# Patient Record
Sex: MALE | Race: WHITE
[De-identification: names, ages, dates, MRNs, and addresses within clinical notes are randomized per-mention and may not be internally consistent; named-entity substitution may affect disease eponyms.]

---

## 2019-06-19 ENCOUNTER — HOSPITAL ENCOUNTER (INPATIENT)
Dept: HOSPITAL 11 - JP.ED | Age: 59
LOS: 2 days | Discharge: HOME | DRG: 720 | End: 2019-06-21
Attending: INTERNAL MEDICINE | Admitting: INTERNAL MEDICINE
Payer: COMMERCIAL

## 2019-06-19 DIAGNOSIS — E23.2: ICD-10-CM

## 2019-06-19 DIAGNOSIS — R11.2: ICD-10-CM

## 2019-06-19 DIAGNOSIS — R41.0: ICD-10-CM

## 2019-06-19 DIAGNOSIS — J96.01: ICD-10-CM

## 2019-06-19 DIAGNOSIS — E78.00: ICD-10-CM

## 2019-06-19 DIAGNOSIS — N17.9: ICD-10-CM

## 2019-06-19 DIAGNOSIS — A41.9: Primary | ICD-10-CM

## 2019-06-19 DIAGNOSIS — J18.1: ICD-10-CM

## 2019-06-19 DIAGNOSIS — R19.7: ICD-10-CM

## 2019-06-19 DIAGNOSIS — Z87.898: ICD-10-CM

## 2019-06-19 DIAGNOSIS — I10: ICD-10-CM

## 2019-06-19 DIAGNOSIS — R65.20: ICD-10-CM

## 2019-06-19 DIAGNOSIS — R51: ICD-10-CM

## 2019-06-19 RX ADMIN — TAZOBACTAM SODIUM AND PIPERACILLIN SODIUM SCH MLS/HR: 500; 4 INJECTION, SOLUTION INTRAVENOUS at 21:22

## 2019-06-19 RX ADMIN — TAZOBACTAM SODIUM AND PIPERACILLIN SODIUM SCH MLS/HR: 500; 4 INJECTION, SOLUTION INTRAVENOUS at 16:28

## 2019-06-19 RX ADMIN — TAZOBACTAM SODIUM AND PIPERACILLIN SODIUM SCH MLS/HR: 500; 4 INJECTION, SOLUTION INTRAVENOUS at 09:36

## 2019-06-19 RX ADMIN — Medication SCH CAP: at 21:20

## 2019-06-19 RX ADMIN — ONDANSETRON PRN MG: 4 TABLET, ORALLY DISINTEGRATING ORAL at 16:27

## 2019-06-19 RX ADMIN — Medication SCH CAP: at 12:06

## 2019-06-19 RX ADMIN — Medication SCH MCG: at 21:21

## 2019-06-19 RX ADMIN — Medication SCH MCG: at 09:37

## 2019-06-19 RX ADMIN — Medication SCH: at 16:39

## 2019-06-19 NOTE — CRLCR
INDICATION:



Fever



TECHNIQUE:



Chest 2 views.



COMPARISON:



 None 



FINDINGS:



Normal cardiomediastinal silhouette. Lung volumes are low which accentuates 

the vascular markings. Minimal atelectasis versus infiltrate at the left 

lung base and lingula. No pneumothorax or effusion. No acute osseous 

abnormality. 



IMPRESSION:



Atelectasis versus infiltrate at the left lung base and lingula.



Dictated by Aliya Beltrán MD @ Jun 19 2019  3:57AM



Signed by Dr. Aliya Beltrán @ Jun 19 2019  3:57AM

## 2019-06-19 NOTE — EDM.PDOC
ED HPI GENERAL MEDICAL PROBLEM





- General


Chief Complaint: Fever


Stated Complaint: HIGH FEVER/CONFUSION


Time Seen by Provider: 06/19/19 01:54


Source of Information: Reports: Patient, Family, RN Notes Reviewed


History Limitations: Reports: Altered Mental Status





- History of Present Illness


INITIAL COMMENTS - FREE TEXT/NARRATIVE: 





59-year-old gentleman presents emergency department today complaint of fever 

and confusion. Gentleman is from [place] he's been here visiting over the 

last 24 hours she's become more confused has had difficulty with ambulation and 

balance has developed a fever which does respond to Tylenol. Does have a 

history of pituitary tumor as well as diabetes insipidus. At this time 

complains of headache and has difficulty finding words appears to be confused 

but does follow commands


Treatments PTA: Reports: Acetaminophen, Other (see below)


Other Treatments PTA: 8pm tylenol


  ** headache


Pain Score (Numeric/FACES): 8





- Related Data


 Allergies











Allergy/AdvReac Type Severity Reaction Status Date / Time


 


No Known Allergies Allergy   Verified 06/19/19 01:47











Home Meds: 


 Home Meds





Desmopressin [Desmopressin 0.1% Nasal Spray] 500 mcg INH DAILY 06/19/19 [History

]


Docusate Sodium 100 mg PO DAILY 06/19/19 [History]


Finasteride [Proscar] 5 mg PO DAILY 06/19/19 [History]


Hydrocortisone [Cortef] 5 mg PO PCDINNER 06/19/19 [History]


Hydrocortisone [Cortef] 12.5 mg PO PCBREAKFAST 06/19/19 [History]


Levothyroxine 112 mcg PO ACBREAKFAST 06/19/19 [History]


Melatonin/Herbal Complex #184 [Melatonin + l-Theanine Softgel] 3 mg PO BEDTIME 

06/19/19 [History]


Nebivolol HCl [Bystolic] 2.5 mg PO DAILY 06/19/19 [History]


Ramipril [Altace] 10 mg PO DAILY 06/19/19 [History]


Rosuvastatin [Crestor] 20 mg PO BEDTIME 06/19/19 [History]


Tamsulosin HCl [Flomax] 0.4 mg PO BEDTIME 06/19/19 [History]


Testosterone 75 gm TOP DAILY 06/19/19 [History]


Zolpidem Tartrate [Ambien] 5 mg PO BEDTIME 06/19/19 [History]











Past Medical History


Endocrine/Metabolic History: Reports: Other (See Below) (Diabetes insipidus)





Social & Family History





- Tobacco Use


Smoking Status *Q: Never Smoker





ED ROS GENERAL





- Review of Systems


Review Of Systems: See Below


Constitutional: Reports: Fever, Chills, Weakness, Fatigue, Other (Cold-like 

symptoms)


HEENT: Reports: No Symptoms


Respiratory: Reports: No Symptoms, Cough


Cardiovascular: Reports: No Symptoms


GI/Abdominal: Reports: No Symptoms


: Reports: No Symptoms


Musculoskeletal: Reports: No Symptoms


Skin: Reports: No Symptoms


Neurological: Reports: Confusion





ED EXAM, SEPSIS





- Physical Exam


Exam: See Below


Text/Narrative:: 





General: Male, ill-appearing, alert GCS 15 but confused difficulty finding 

words difficulty completing sentences HEENT: head is atraumatic normocephalic, 

eyes pupils equal round reactive to light, sclera clear no conjunctivitis 

appreciated.  Ears tympanic membranes clear and gray landmarks and light reflex 

are present bilaterally canals are clear.  Nose no septal deviation, nares are 

clear, no blood present.  Mouth mucosa is moist and pink no erythema or exudate 

noted in soft palate, tongue is midline uvula is midline, dentition is intact.


Neck: Supple no thyromegaly no tracheal deviation.


Nodes: Cervical nodes subclavicular nodes nontender no palpable lymphadenopathy 

noted.


Lungs: clear to auscultation bilaterally with symmetrical respirations, no 

adventitious noise appreciated.


CV: Regular rate and rhythm S1 and S2 appreciated no murmurs rubs or gallops 

noted.


Abdomen: Soft, nontender, no palpable masses or organomegaly appreciated, no 

distention no guarding bowel sounds are present, .


Neuro: Cranial nerves II through XII grossly intact


Skin: Warm and dry, intact


Extremities: No lower extremity edema appreciated, pedal pulse is +2.





He places his chin on his chest this does not increase his headache pain





Course





- Vital Signs


Last Recorded V/S: 


 Last Vital Signs











Temp  99.5 F   06/19/19 04:51


 


Pulse  87   06/19/19 04:51


 


Resp  20   06/19/19 04:51


 


BP  124/77   06/19/19 04:51


 


Pulse Ox  91 L  06/19/19 04:51














- Orders/Labs/Meds


Orders: 


 Active Orders 24 hr











 Category Date Time Status


 


 Peripheral IV Care [RC] .AS DIRECTED Care  06/19/19 02:06 Active


 


 Peripheral IV Care [RC] .AS DIRECTED Care  06/19/19 02:12 Active


 


 Vital Signs [RC] Q1H Care  06/19/19 02:00 Active


 


 CULTURE BLOOD [BC] Urgent Lab  06/19/19 01:45 Received


 


 CULTURE BLOOD [BC] Urgent Lab  06/19/19 02:00 Received


 


 Lactated Ringers [Ringers, Lactated] 1,000 ml Med  06/19/19 02:00 Active





 IV ASDIRECTED   


 


 Lactated Ringers [Ringers, Lactated] 1,000 ml Med  06/19/19 03:45 Active





 IV ASDIRECTED   


 


 Sodium Chloride 0.9% [Saline Flush] Med  06/19/19 02:06 Active





 10 ml FLUSH ASDIRECTED PRN   


 


 Sodium Chloride 0.9% [Saline Flush] Med  06/19/19 02:11 Active





 10 ml FLUSH ASDIRECTED PRN   


 


 Blood Culture x2 Reflex Set [OM.PC] Urgent Oth  06/19/19 02:00 Ordered


 


 Peripheral IV Insertion Adult [OM.PC] Routine Oth  06/19/19 02:11 Ordered


 


 Peripheral IV Insertion Adult [OM.PC] Urgent Oth  06/19/19 02:06 Ordered








 Medication Orders





Lactated Ringer's (Ringers, Lactated)  1,000 mls @ 999 mls/hr IV ASDIRECTED [facility]


   Last Admin: 06/19/19 02:13  Dose: 999 mls/hr


Lactated Ringer's (Ringers, Lactated)  1,000 mls @ 999 mls/hr IV ASDIRECTED CYDNEY


   Last Admin: 06/19/19 03:42  Dose: 999 mls/hr


Sodium Chloride (Saline Flush)  10 ml FLUSH ASDIRECTED PRN


   PRN Reason: Keep Vein Open


   Last Admin: 06/19/19 02:14  Dose: 10 ml


Sodium Chloride (Saline Flush)  10 ml FLUSH ASDIRECTED PRN


   PRN Reason: Keep Vein Open


   Last Admin: 06/19/19 02:35  Dose: 10 ml








Labs: 


 Laboratory Tests











  06/19/19 06/19/19 06/19/19 Range/Units





  02:00 02:00 02:00 


 


WBC  4.8    (4.5-11.0)  K/uL


 


RBC  5.60    (4.30-5.90)  M/uL


 


Hgb  15.5 H    (12.0-15.0)  g/dL


 


Hct  46.3    (40.0-54.0)  %


 


MCV  83    (80-98)  fL


 


MCH  28    (27-31)  pg


 


MCHC  34    (32-36)  %


 


Plt Count  112 L    (150-400)  K/uL


 


Neut % (Auto)  46    (36-66)  %


 


Lymph % (Auto)  32    (24-44)  %


 


Mono % (Auto)  21 H    (2-6)  %


 


Eos % (Auto)  0 L    (2-4)  %


 


Baso % (Auto)  1    (0-1)  %


 


Sodium   134 L   (140-148)  mmol/L


 


Potassium   4.4   (3.6-5.2)  mmol/L


 


Chloride   98 L   (100-108)  mmol/L


 


Carbon Dioxide   25   (21-32)  mmol/L


 


Anion Gap   15.4 H   (5.0-14.0)  mmol/L


 


BUN   21 H   (7-18)  mg/dL


 


Creatinine   1.8 H   (0.8-1.3)  mg/dL


 


Est Cr Clr Drug Dosing   40.29   mL/min


 


Estimated GFR (MDRD)   39 L   (>60)  


 


Glucose   94   ()  mg/dL


 


Lactic Acid    1.4  (0.4-2.0)  mmol/L


 


Calcium   9.2   (8.5-10.1)  mg/dL


 


Total Bilirubin   0.9   (0.2-1.0)  mg/dL


 


AST   37   (15-37)  U/L


 


ALT   50   (12-78)  U/L


 


Alkaline Phosphatase   53   ()  U/L


 


C-Reactive Protein   9.46 H   (0.0-0.3)  mg/dL


 


Total Protein   6.8   (6.4-8.2)  g/dL


 


Albumin   3.9   (3.4-5.0)  g/dL


 


Globulin   2.9   (2.3-3.5)  g/dL


 


Albumin/Globulin Ratio   1.3   (1.2-2.2)  


 


Procalcitonin     ng/mL


 


Urine Color     


 


Urine Appearance     


 


Urine pH     (4.5-8.0)  


 


Ur Specific Gravity     (1.008-1.030)  


 


Urine Protein     (NEGATIVE)  mg/dL


 


Urine Glucose (UA)     (NEGATIVE)  mg/dL


 


Urine Ketones     (NEGATIVE)  mg/dL


 


Urine Occult Blood     (NEGATIVE)  


 


Urine Nitrite     (NEGAITVE)  


 


Urine Bilirubin     (NEGATIVE)  


 


Urine Urobilinogen     (NORMAL)  mg/dL


 


Ur Leukocyte Esterase     (NEGATIVE)  


 


Urine RBC     (0-5)  


 


Urine WBC     (0-5)  


 


Ur Epithelial Cells     


 


Amorphous Sediment     


 


Urine Bacteria     


 


Urine Mucus     














  06/19/19 06/19/19 Range/Units





  02:00 04:05 


 


WBC    (4.5-11.0)  K/uL


 


RBC    (4.30-5.90)  M/uL


 


Hgb    (12.0-15.0)  g/dL


 


Hct    (40.0-54.0)  %


 


MCV    (80-98)  fL


 


MCH    (27-31)  pg


 


MCHC    (32-36)  %


 


Plt Count    (150-400)  K/uL


 


Neut % (Auto)    (36-66)  %


 


Lymph % (Auto)    (24-44)  %


 


Mono % (Auto)    (2-6)  %


 


Eos % (Auto)    (2-4)  %


 


Baso % (Auto)    (0-1)  %


 


Sodium    (140-148)  mmol/L


 


Potassium    (3.6-5.2)  mmol/L


 


Chloride    (100-108)  mmol/L


 


Carbon Dioxide    (21-32)  mmol/L


 


Anion Gap    (5.0-14.0)  mmol/L


 


BUN    (7-18)  mg/dL


 


Creatinine    (0.8-1.3)  mg/dL


 


Est Cr Clr Drug Dosing    mL/min


 


Estimated GFR (MDRD)    (>60)  


 


Glucose    ()  mg/dL


 


Lactic Acid    (0.4-2.0)  mmol/L


 


Calcium    (8.5-10.1)  mg/dL


 


Total Bilirubin    (0.2-1.0)  mg/dL


 


AST    (15-37)  U/L


 


ALT    (12-78)  U/L


 


Alkaline Phosphatase    ()  U/L


 


C-Reactive Protein    (0.0-0.3)  mg/dL


 


Total Protein    (6.4-8.2)  g/dL


 


Albumin    (3.4-5.0)  g/dL


 


Globulin    (2.3-3.5)  g/dL


 


Albumin/Globulin Ratio    (1.2-2.2)  


 


Procalcitonin  0.18   ng/mL


 


Urine Color   Yellow  


 


Urine Appearance   Clear  


 


Urine pH   5.0  (4.5-8.0)  


 


Ur Specific Gravity   1.010  (1.008-1.030)  


 


Urine Protein   Negative  (NEGATIVE)  mg/dL


 


Urine Glucose (UA)   Normal  (NEGATIVE)  mg/dL


 


Urine Ketones   Negative  (NEGATIVE)  mg/dL


 


Urine Occult Blood   Negative  (NEGATIVE)  


 


Urine Nitrite   Negative  (NEGAITVE)  


 


Urine Bilirubin   Negative  (NEGATIVE)  


 


Urine Urobilinogen   Normal  (NORMAL)  mg/dL


 


Ur Leukocyte Esterase   Negative  (NEGATIVE)  


 


Urine RBC   0-5  (0-5)  


 


Urine WBC   0-5  (0-5)  


 


Ur Epithelial Cells   Not seen  


 


Amorphous Sediment   Few  


 


Urine Bacteria   Not seen  


 


Urine Mucus   Not seen  











Meds: 


Medications











Generic Name Dose Route Start Last Admin





  Trade Name Freq  PRN Reason Stop Dose Admin


 


Lactated Ringer's  1,000 mls @ 999 mls/hr  06/19/19 02:00  06/19/19 02:13





  Ringers, Lactated  IV   999 mls/hr





  ASDIRECTED CYDNEY   Administration





     





     





     





     


 


Lactated Ringer's  1,000 mls @ 999 mls/hr  06/19/19 03:45  06/19/19 03:42





  Ringers, Lactated  IV   999 mls/hr





  ASDIRECTED CYDNEY   Administration





     





     





     





     


 


Sodium Chloride  10 ml  06/19/19 02:06  06/19/19 02:14





  Saline Flush  FLUSH   10 ml





  ASDIRECTED PRN   Administration





  Keep Vein Open   





     





     





     


 


Sodium Chloride  10 ml  06/19/19 02:11  06/19/19 02:35





  Saline Flush  FLUSH   10 ml





  ASDIRECTED PRN   Administration





  Keep Vein Open   





     





     





     














Discontinued Medications














Generic Name Dose Route Start Last Admin





  Trade Name Freq  PRN Reason Stop Dose Admin


 


Fentanyl  50 mcg  06/19/19 04:49  06/19/19 04:57





  Sublimaze  IVPUSH  06/19/19 04:50  50 mcg





  ONETIME ONE   Administration





     





     





     





     


 


Piperacillin Sod/Tazobactam  100 mls @ 200 mls/hr  06/19/19 02:00  06/19/19 03:

14





  Sod 4.5 gm/ Sodium Chloride  IV   Not Given





  Q6H CYDNEY   





     





     





     





     


 


Piperacillin Sod/Tazobactam  100 mls @ 100 mls/hr  06/19/19 02:07  06/19/19 02:

40





  Sod 4.5 gm/ Sodium Chloride  IV  06/19/19 03:06  100 mls/hr





  ONETIME ONE   Administration





     





     





     





     


 


Ibuprofen  600 mg  06/19/19 02:02  06/19/19 02:08





  Motrin  PO  06/19/19 02:03  600 mg





  ONETIME ONE   Administration





     





     





     





     


 


Ondansetron HCl  4 mg  06/19/19 02:28  06/19/19 02:33





  Zofran  IVPUSH  06/19/19 02:29  4 mg





  ONETIME ONE   Administration





     





     





     





     














Departure





- Departure


Time of Disposition: 05:28


Disposition: Refer to Observation


Condition: Fair


Clinical Impression: 


Community acquired pneumonia


Qualifiers:


 Laterality: left Lung location: lower lobe of lung Qualified Code(s): J18.1 - 

Lobar pneumonia, unspecified organism








- Discharge Information


Referrals: 


PCP,None [Primary Care Provider] - 


Forms:  ED Department Discharge





- My Orders


Last 24 Hours: 


My Active Orders





06/19/19 01:45


CULTURE BLOOD [BC] Urgent 





06/19/19 02:00


Vital Signs [RC] Q1H 


CULTURE BLOOD [BC] Urgent 


Lactated Ringers [Ringers, Lactated] 1,000 ml IV ASDIRECTED 


Blood Culture x2 Reflex Set [OM.PC] Urgent 





06/19/19 02:06


Peripheral IV Care [RC] .AS DIRECTED 


Sodium Chloride 0.9% [Saline Flush]   10 ml FLUSH ASDIRECTED PRN 


Peripheral IV Insertion Adult [OM.PC] Urgent 





06/19/19 02:11


Sodium Chloride 0.9% [Saline Flush]   10 ml FLUSH ASDIRECTED PRN 


Peripheral IV Insertion Adult [OM.PC] Routine 





06/19/19 02:12


Peripheral IV Care [RC] .AS DIRECTED 





06/19/19 03:45


Lactated Ringers [Ringers, Lactated] 1,000 ml IV ASDIRECTED 














- Assessment/Plan


Last 24 Hours: 


My Active Orders





06/19/19 01:45


CULTURE BLOOD [BC] Urgent 





06/19/19 02:00


Vital Signs [RC] Q1H 


CULTURE BLOOD [BC] Urgent 


Lactated Ringers [Ringers, Lactated] 1,000 ml IV ASDIRECTED 


Blood Culture x2 Reflex Set [OM.PC] Urgent 





06/19/19 02:06


Peripheral IV Care [RC] .AS DIRECTED 


Sodium Chloride 0.9% [Saline Flush]   10 ml FLUSH ASDIRECTED PRN 


Peripheral IV Insertion Adult [OM.PC] Urgent 





06/19/19 02:11


Sodium Chloride 0.9% [Saline Flush]   10 ml FLUSH ASDIRECTED PRN 


Peripheral IV Insertion Adult [OM.PC] Routine 





06/19/19 02:12


Peripheral IV Care [RC] .AS DIRECTED 





06/19/19 03:45


Lactated Ringers [Ringers, Lactated] 1,000 ml IV ASDIRECTED 











Plan: 





Assessment





Acuity = acute





Site and laterality = me acquired pneumonia  





Etiology  = probable bacterial cause  





Manifestations = alert fever, cough 





Location of injury =  Home





Lab values = CBC unremarkable CMP reveals creatinine elevated 1.8 consistent 

with acute renal failure stage G IIIB CRP elevated at 9.46 lactic acid normal 

1.4 urinalysis unremarkable, chest x-ray shows left lung atelectasis versus 

filtrate, CT scan of head also unremarkable, blood cultures are pending  





Plan


Called discussed case with hospitalist on-call at [phone] kindly agreed to come and 

evaluate patient emergency department for admission thus far he has received 2 

L of lactated Ringer's and Zosyn 4.5 mg dose 1  

















 This note was dictated using dragon voice recognition software please call 

with any questions on syntax or grammar.

## 2019-06-19 NOTE — PCM.PN
- General Info


Date of Service: 06/19/19


Subjective Update: 





There were no acute events since admission. Temperature curve has been normal. 

Patient continues to have a headache and feels tired. He is on supplemental 

oxygen. He has had several episodes of watery diarrhea. No complaints of 

abdominal pain.


Functional Status: Reports: Pain Controlled





- Review of Systems


General: Reports: Fever, Weakness





- Patient Data


Vitals - Most Recent: 


 Last Vital Signs











Temp  37.3 C   06/19/19 07:14


 


Pulse  72   06/19/19 07:14


 


Resp  15   06/19/19 07:14


 


BP  90/41 L  06/19/19 07:14


 


Pulse Ox  93 L  06/19/19 09:43











Weight - Most Recent: 95.254 kg


Lab Results Last 24 Hours: 


 Laboratory Results - last 24 hr











  06/19/19 06/19/19 06/19/19 Range/Units





  02:00 02:00 02:00 


 


WBC  4.8    (4.5-11.0)  K/uL


 


RBC  5.60    (4.30-5.90)  M/uL


 


Hgb  15.5 H    (12.0-15.0)  g/dL


 


Hct  46.3    (40.0-54.0)  %


 


MCV  83    (80-98)  fL


 


MCH  28    (27-31)  pg


 


MCHC  34    (32-36)  %


 


Plt Count  112 L    (150-400)  K/uL


 


Neut % (Auto)  46    (36-66)  %


 


Lymph % (Auto)  32    (24-44)  %


 


Mono % (Auto)  21 H    (2-6)  %


 


Eos % (Auto)  0 L    (2-4)  %


 


Baso % (Auto)  1    (0-1)  %


 


Sodium   134 L   (140-148)  mmol/L


 


Potassium   4.4   (3.6-5.2)  mmol/L


 


Chloride   98 L   (100-108)  mmol/L


 


Carbon Dioxide   25   (21-32)  mmol/L


 


Anion Gap   15.4 H   (5.0-14.0)  mmol/L


 


BUN   21 H   (7-18)  mg/dL


 


Creatinine   1.8 H   (0.8-1.3)  mg/dL


 


Est Cr Clr Drug Dosing   40.29   mL/min


 


Estimated GFR (MDRD)   39 L   (>60)  


 


Glucose   94   ()  mg/dL


 


Lactic Acid    1.4  (0.4-2.0)  mmol/L


 


Calcium   9.2   (8.5-10.1)  mg/dL


 


Total Bilirubin   0.9   (0.2-1.0)  mg/dL


 


AST   37   (15-37)  U/L


 


ALT   50   (12-78)  U/L


 


Alkaline Phosphatase   53   ()  U/L


 


C-Reactive Protein   9.46 H   (0.0-0.3)  mg/dL


 


Total Protein   6.8   (6.4-8.2)  g/dL


 


Albumin   3.9   (3.4-5.0)  g/dL


 


Globulin   2.9   (2.3-3.5)  g/dL


 


Albumin/Globulin Ratio   1.3   (1.2-2.2)  


 


Procalcitonin     ng/mL


 


Urine Color     


 


Urine Appearance     


 


Urine pH     (4.5-8.0)  


 


Ur Specific Gravity     (1.008-1.030)  


 


Urine Protein     (NEGATIVE)  mg/dL


 


Urine Glucose (UA)     (NEGATIVE)  mg/dL


 


Urine Ketones     (NEGATIVE)  mg/dL


 


Urine Occult Blood     (NEGATIVE)  


 


Urine Nitrite     (NEGAITVE)  


 


Urine Bilirubin     (NEGATIVE)  


 


Urine Urobilinogen     (NORMAL)  mg/dL


 


Ur Leukocyte Esterase     (NEGATIVE)  


 


Urine RBC     (0-5)  


 


Urine WBC     (0-5)  


 


Ur Epithelial Cells     


 


Amorphous Sediment     


 


Urine Bacteria     


 


Urine Mucus     














  06/19/19 06/19/19 Range/Units





  02:00 04:05 


 


WBC    (4.5-11.0)  K/uL


 


RBC    (4.30-5.90)  M/uL


 


Hgb    (12.0-15.0)  g/dL


 


Hct    (40.0-54.0)  %


 


MCV    (80-98)  fL


 


MCH    (27-31)  pg


 


MCHC    (32-36)  %


 


Plt Count    (150-400)  K/uL


 


Neut % (Auto)    (36-66)  %


 


Lymph % (Auto)    (24-44)  %


 


Mono % (Auto)    (2-6)  %


 


Eos % (Auto)    (2-4)  %


 


Baso % (Auto)    (0-1)  %


 


Sodium    (140-148)  mmol/L


 


Potassium    (3.6-5.2)  mmol/L


 


Chloride    (100-108)  mmol/L


 


Carbon Dioxide    (21-32)  mmol/L


 


Anion Gap    (5.0-14.0)  mmol/L


 


BUN    (7-18)  mg/dL


 


Creatinine    (0.8-1.3)  mg/dL


 


Est Cr Clr Drug Dosing    mL/min


 


Estimated GFR (MDRD)    (>60)  


 


Glucose    ()  mg/dL


 


Lactic Acid    (0.4-2.0)  mmol/L


 


Calcium    (8.5-10.1)  mg/dL


 


Total Bilirubin    (0.2-1.0)  mg/dL


 


AST    (15-37)  U/L


 


ALT    (12-78)  U/L


 


Alkaline Phosphatase    ()  U/L


 


C-Reactive Protein    (0.0-0.3)  mg/dL


 


Total Protein    (6.4-8.2)  g/dL


 


Albumin    (3.4-5.0)  g/dL


 


Globulin    (2.3-3.5)  g/dL


 


Albumin/Globulin Ratio    (1.2-2.2)  


 


Procalcitonin  0.18   ng/mL


 


Urine Color   Yellow  


 


Urine Appearance   Clear  


 


Urine pH   5.0  (4.5-8.0)  


 


Ur Specific Gravity   1.010  (1.008-1.030)  


 


Urine Protein   Negative  (NEGATIVE)  mg/dL


 


Urine Glucose (UA)   Normal  (NEGATIVE)  mg/dL


 


Urine Ketones   Negative  (NEGATIVE)  mg/dL


 


Urine Occult Blood   Negative  (NEGATIVE)  


 


Urine Nitrite   Negative  (NEGAITVE)  


 


Urine Bilirubin   Negative  (NEGATIVE)  


 


Urine Urobilinogen   Normal  (NORMAL)  mg/dL


 


Ur Leukocyte Esterase   Negative  (NEGATIVE)  


 


Urine RBC   0-5  (0-5)  


 


Urine WBC   0-5  (0-5)  


 


Ur Epithelial Cells   Not seen  


 


Amorphous Sediment   Few  


 


Urine Bacteria   Not seen  


 


Urine Mucus   Not seen  











Med Orders - Current: 


 Current Medications





Acetaminophen (Tylenol)  650 mg PO Q4H PRN


   PRN Reason: Pain (Mild 1-3)/fever


   Last Admin: 06/19/19 08:05 Dose:  650 mg


Albuterol (Proventil Neb Soln)  2.5 mg NEB Q4H PRN


   PRN Reason: Shortness Of Breath/wheezing


Albuterol/Ipratropium (Duoneb 3.0-0.5 Mg/3 Ml)  3 ml NEB QID PRN


   PRN Reason: Shortness Of Breath/wheezing


Enoxaparin Sodium (Lovenox)  40 mg SUBCUT DAILY [facility]


   Last Admin: 06/19/19 09:36 Dose:  40 mg


Finasteride (Proscar)  5 mg PO DAILY [facility]


   Last Admin: 06/19/19 09:35 Dose:  5 mg


Hydrocortisone (Cortef)  5 mg PO PCDINNER [facility]


Hydrocortisone (Cortef)  12.5 mg PO PCBREAKFAST [facility]


   Last Admin: 06/19/19 09:36 Dose:  12.5 mg


Piperacillin/Tazobactam/ (Dextrose 4.5 gm/ Premix)  100 mls @ 200 mls/hr IV Q6H 

[facility]


   Last Admin: 06/19/19 09:36 Dose:  200 mls/hr


Sodium Chloride (Normal Saline)  1,000 mls @ 125 mls/hr IV ASDIRECTED [facility]


   Last Admin: 06/19/19 08:06 Dose:  125 mls/hr


Levofloxacin/Dextrose 750 mg/ (Premix)  150 mls @ 100 mls/hr IV Q48H [facility]


Ibuprofen (Motrin)  800 mg PO Q6H PRN


   PRN Reason: Pain (mild 1-3)


Levothyroxine Sodium (Levothyroxine)  112 mcg PO ACBREAKFAST [facility]


   Last Admin: 06/19/19 09:35 Dose:  112 mcg


Lorazepam (Ativan)  1 mg IV Q6H PRN


   PRN Reason: Nausea/Vomiting


Melatonin (Melatonin)  3 mg PO BEDTIME [facility]


Morphine Sulfate (Morphine)  2 mg IVPUSH Q2H PRN


   PRN Reason: Pain (severe 7-10)


Nebivolol (Bystolic)  2.5 mg PO DAILY [facility]


(Desmopressin [ Desmopressin 0.1% Nasal Spray] 500 Mcg )  0 mcg KAYE BID [facility]


   Last Admin: 06/19/19 09:37 Dose:  10 mcg


Non-Formulary Medication (Testosterone [Testosterone])  75 gm TOP DAILY [facility]


Ondansetron HCl (Zofran Odt)  4 mg PO Q6H PRN


   PRN Reason: Nausea able to take PO


Oxycodone HCl (Oxycodone)  5 mg PO Q4H PRN


   PRN Reason: Pain (moderate 4-6)


Pantoprazole Sodium (Protonix***)  40 mg PO DAILY [facility]


   Last Admin: 06/19/19 09:36 Dose:  40 mg


Ramipril (Altace)  10 mg PO DAILY [facility]


Rosuvastatin Calcium (Crestor)  20 mg PO BEDTIME [facility]


Tamsulosin HCl (Flomax)  0.4 mg PO BEDTIME [facility]


Zolpidem Tartrate (Ambien)  5 mg PO BEDTIME [facility]





Discontinued Medications





Fentanyl (Sublimaze)  50 mcg IVPUSH ONETIME ONE


   Stop: 06/19/19 04:50


   Last Admin: 06/19/19 04:57 Dose:  50 mcg


Lactated Ringer's (Ringers, Lactated)  1,000 mls @ 999 mls/hr IV ASDIRECTED [facility]


   Last Admin: 06/19/19 02:13 Dose:  999 mls/hr


Piperacillin Sod/Tazobactam (Sod 4.5 gm/ Sodium Chloride)  100 mls @ 200 mls/hr 

IV Q6H [facility]


   Last Admin: 06/19/19 03:14 Dose:  Not Given


Piperacillin Sod/Tazobactam (Sod 4.5 gm/ Sodium Chloride)  100 mls @ 100 mls/hr 

IV ONETIME ONE


   Stop: 06/19/19 03:06


   Last Admin: 06/19/19 02:40 Dose:  100 mls/hr


Lactated Ringer's (Ringers, Lactated)  1,000 mls @ 999 mls/hr IV ASDIRECTED [facility]


   Last Admin: 06/19/19 03:42 Dose:  999 mls/hr


Sodium Chloride (Normal Saline)  1,000 mls @ 200 mls/hr IV ASDIRECTED [facility]


   Last Admin: 06/19/19 06:42 Dose:  200 mls/hr


Sodium Chloride (Normal Saline)  500 mls @ 999 mls/hr IV ASDIRECTED [facility]


   Stop: 06/19/19 08:31


Ibuprofen (Motrin)  600 mg PO ONETIME ONE


   Stop: 06/19/19 02:03


   Last Admin: 06/19/19 02:08 Dose:  600 mg


Ondansetron HCl (Zofran)  4 mg IVPUSH ONETIME ONE


   Stop: 06/19/19 02:29


   Last Admin: 06/19/19 02:33 Dose:  4 mg


Sodium Chloride (Saline Flush)  10 ml FLUSH ASDIRECTED PRN


   PRN Reason: Keep Vein Open


   Last Admin: 06/19/19 02:14 Dose:  10 ml


Sodium Chloride (Saline Flush)  10 ml FLUSH ASDIRECTED PRN


   PRN Reason: Keep Vein Open


   Last Admin: 06/19/19 02:35 Dose:  10 ml











- Exam


Quality Assessment: Supplemental Oxygen


General: Alert, Oriented, Cooperative, No Acute Distress


Lungs: Normal Respiratory Effort, Crackles (left lung base).  No: Wheezing


Cardiovascular: Regular Rate, Regular Rhythm


GI/Abdominal Exam: Soft, No Distention


Extremities: No Pedal Edema.  No: Increased Warmth


Psy/Mental Status: Alert, Normal Affect





- Problem List Review


Problem List Initiated/Reviewed/Updated: Yes





- My Orders


Last 24 Hours: 


My Active Orders





06/19/19 08:00


Sodium Chloride 0.9% [Normal Saline] 1,000 ml IV ASDIRECTED 





06/19/19 08:51


Isolation [COMM] Stat 





06/19/19 08:53


CLOSTRIDIUM DIFFICILE BY PCR [RM] Routine 





06/19/19 10:00


Levofloxacin/Dextrose 5%-Water [Levaquin in D5W 750 MG/150 ML] 750 mg   Premix 

Bag 1 bag IV Q48H 





06/20/19 05:00


BASIC METABOLIC PANEL,BMP [CHEM] Timed 


CBC W/O DIFF,HEMOGRAM [HEME] Timed (1) 














- Plan


Plan:: 





ASSESSMENT AND PLAN OF CARE





Left lower lobe Pneumonia - still on supplemental oxygen. Temperature curve is 

better. Shortness of breath is better.


-Continue IV fluids


-Continue Pip/Tazo


-oxygen to keep sats greater than 95%


-Duo nebs every 6 hours prn, Albuterol nebs every 4 hours prn                  

  


-Follow-up blood cultures





Acute kidney injury - creatinine of 1.8, baseline unknown but I suspect this is 

twice what is normal creatinine level is.


-IV fluids as above and repeat labs in the morning





Hx of Pituatary Tumor, with resection x 3, Diabetes Insipidus - which light 

stable at this time.


-continue home medications


-has Desmopressin nasal spray 1 spray bid, patient has his own, must be kept in 

refrigerated


-blood glucose check





Maintenance issues


-Nutrition: regular diet


-Riddle catheter -not indicated at this time


-DVT: Lovenox 40 subcut daily


-GI; PO Protonix 40mg daily





Disposition: home with Family





Primary care provider: Brett Rodriguez MD

## 2019-06-19 NOTE — CRLCT
INDICATION:



Fever, confusion



TECHNIQUE:



Head CT without contrast.



COMPARISON:



 None 



FINDINGS:



CSF spaces: Within normal limits for age.  



Brain parenchyma: There are nonspecific low attenuation white matter 

changes consistent with chronic microvascular disease.  No sign of mass, 

hemorrhage, or midline shift.  



Skull base and calvarium: Thickening of the ethmoid and sphenoid sinus 

mucosa. Mastoid air cells are clear. The visualized orbits are grossly 

unremarkable.  No skull fractures.  There is intracranial atherosclerosis.  

  



IMPRESSION:



1.  No acute findings. 



2.  Nonspecific white matter disease, typical of chronic microvascular 

disease.



Please note that all CT scans at this facility use dose modulation, 

iterative reconstruction, and/or weight-based dosing when appropriate to 

reduce radiation dose to as low as reasonably achievable.



Dictated by Aliya Beltrán MD @ Jun 19 2019  3:59AM



Signed by Dr. Aliya Beltrán @ Jun 19 2019  3:59AM

## 2019-06-20 RX ADMIN — TAZOBACTAM SODIUM AND PIPERACILLIN SODIUM SCH MLS/HR: 500; 4 INJECTION, SOLUTION INTRAVENOUS at 09:33

## 2019-06-20 RX ADMIN — Medication SCH MCG: at 08:19

## 2019-06-20 RX ADMIN — Medication SCH CAP: at 08:13

## 2019-06-20 RX ADMIN — ONDANSETRON PRN MG: 4 TABLET, ORALLY DISINTEGRATING ORAL at 17:17

## 2019-06-20 RX ADMIN — TAZOBACTAM SODIUM AND PIPERACILLIN SODIUM SCH MLS/HR: 500; 4 INJECTION, SOLUTION INTRAVENOUS at 02:32

## 2019-06-20 RX ADMIN — Medication SCH MCG: at 21:17

## 2019-06-20 RX ADMIN — Medication SCH EACH: at 08:15

## 2019-06-20 RX ADMIN — ONDANSETRON PRN MG: 4 TABLET, ORALLY DISINTEGRATING ORAL at 08:07

## 2019-06-20 RX ADMIN — Medication SCH CAP: at 21:12

## 2019-06-20 NOTE — PCM.PN
- General Info


Date of Service: 06/20/19


Subjective Update: 





There were no acute events overnight and the patient reports he rested well. 

This morning after breakfast he developed nausea and did vomit. Headache did 

resolve yesterday but is back today. He does not feel significantly short of 

breath. He is not coughing much. Does continue to have diarrhea. Not much of an 

appetite. Blood pressure has improved.


Functional Status: Reports: Pain Controlled





- Review of Systems


General: Denies: Fever


Gastrointestinal: Reports: Diarrhea, Vomiting





- Patient Data


Vitals - Most Recent: 


 Last Vital Signs











Temp  36.6 C   06/20/19 07:30


 


Pulse  60   06/20/19 08:12


 


Resp  16   06/20/19 07:30


 


BP  128/77   06/20/19 10:24


 


Pulse Ox  96   06/20/19 07:30











Weight - Most Recent: 95.254 kg


I&O - Last 24 Hours: 


 Intake & Output











 06/19/19 06/20/19 06/20/19





 22:59 06:59 14:59


 


Intake Total 1698 1240 300


 


Output Total   50


 


Balance 1698 1240 250











Lab Results Last 24 Hours: 


 Laboratory Results - last 24 hr











  06/20/19 06/20/19 Range/Units





  05:42 05:42 


 


WBC  2.6 L   (4.5-11.0)  K/uL


 


RBC  4.55   (4.30-5.90)  M/uL


 


Hgb  12.5  D   (12.0-15.0)  g/dL


 


Hct  37.4 L   (40.0-54.0)  %


 


MCV  82   (80-98)  fL


 


MCH  28   (27-31)  pg


 


MCHC  33   (32-36)  %


 


Plt Count  81 L   (150-400)  K/uL


 


Sodium   132 L  (140-148)  mmol/L


 


Potassium   3.8  (3.6-5.2)  mmol/L


 


Chloride   100  (100-108)  mmol/L


 


Carbon Dioxide   23  (21-32)  mmol/L


 


Anion Gap   12.8  (5.0-14.0)  mmol/L


 


BUN   12  (7-18)  mg/dL


 


Creatinine   1.0  (0.8-1.3)  mg/dL


 


Est Cr Clr Drug Dosing   72.35  mL/min


 


Estimated GFR (MDRD)   > 60  (>60)  


 


Glucose   94  ()  mg/dL


 


Calcium   7.7 L D  (8.5-10.1)  mg/dL











Leo Results Last 24 Hours: 


 Microbiology











 06/19/19 01:45 Aerobic Blood Culture - Preliminary





 Blood - Venous - Lab Draw    NO GROWTH AFTER 1 DAY





 Anaerobic Blood Culture - Preliminary





    NO GROWTH AFTER 1 DAY


 


 06/19/19 02:00 Aerobic Blood Culture - Preliminary





 Blood - Venous    NO GROWTH AFTER 1 DAY





 Anaerobic Blood Culture - Preliminary





    NO GROWTH AFTER 1 DAY


 


 06/19/19 08:53 Clostridioides difficile (PCR) - Final





 Stool / Feces    NEGATIVE CDIFF TOXIN











Med Orders - Current: 


 Current Medications





Acetaminophen (Tylenol)  650 mg PO Q4H PRN


   PRN Reason: Pain (Mild 1-3)/fever


   Last Admin: 06/20/19 07:39 Dose:  650 mg


Albuterol (Proventil Neb Soln)  2.5 mg NEB Q4H PRN


   PRN Reason: Shortness Of Breath/wheezing


Albuterol/Ipratropium (Duoneb 3.0-0.5 Mg/3 Ml)  3 ml NEB QID PRN


   PRN Reason: Shortness Of Breath/wheezing


Finasteride (Proscar)  5 mg PO DAILY [facility]


   Last Admin: 06/20/19 08:14 Dose:  5 mg


Hydrocortisone (Cortef)  5 mg PO PCDINNER [facility]


   Last Admin: 06/19/19 18:21 Dose:  5 mg


Hydrocortisone (Cortef)  12.5 mg PO PCBREAKFAST [facility]


   Last Admin: 06/20/19 08:10 Dose:  12.5 mg


Levofloxacin/Dextrose 750 mg/ (Premix)  150 mls @ 100 mls/hr IV Q24H [facility]


Sodium Chloride (Normal Saline)  1,000 mls @ 50 mls/hr IV ASDIRECTED [facility]


Ibuprofen (Motrin)  800 mg PO Q6H PRN


   PRN Reason: Pain (mild 1-3)


   Last Admin: 06/20/19 08:09 Dose:  800 mg


Lactobacillus Rhamnosus (Culturelle)  1 cap PO BID [facility]


   Last Admin: 06/20/19 08:13 Dose:  1 cap


Levothyroxine Sodium (Levothyroxine)  112 mcg PO ACBREAKFAST [facility]


   Last Admin: 06/20/19 07:39 Dose:  112 mcg


Lorazepam (Ativan)  0.5 mg IVPUSH Q4H PRN


   PRN Reason: Nausea/Vomiting


Melatonin (Melatonin)  3 mg PO BEDTIME [facility]


   Last Admin: 06/19/19 21:20 Dose:  3 mg


Morphine Sulfate (Morphine)  2 mg IVPUSH Q2H PRN


   PRN Reason: Pain (severe 7-10)


(Desmopressin [ Desmopressin 0.1% Nasal Spray] 500 Mcg )  0 mcg AKYE BID [facility]


   Last Admin: 06/20/19 08:19 Dose:  1 mcg


Ondansetron HCl (Zofran Odt)  4 mg PO Q6H PRN


   PRN Reason: Nausea able to take PO


   Last Admin: 06/20/19 08:07 Dose:  4 mg


Oxycodone HCl (Oxycodone)  5 mg PO Q4H PRN


   PRN Reason: Pain (moderate 4-6)


Pantoprazole Sodium (Protonix***)  40 mg PO DAILY [facility]


   Last Admin: 06/20/19 08:14 Dose:  40 mg


Testosterone 1.62% (Gel 2.5gm Pkt**Pom**)  0 each TOP DAILY [facility]


   Last Admin: 06/20/19 08:15 Dose:  1 each


Ramipril (Altace)  10 mg PO DAILY [facility]


   Last Admin: 06/20/19 08:11 Dose:  10 mg


Rosuvastatin Calcium (Crestor)  20 mg PO BEDTIME [facility]


   Last Admin: 06/19/19 21:21 Dose:  20 mg


Tamsulosin HCl (Flomax)  0.4 mg PO BEDTIME [facility]


   Last Admin: 06/19/19 21:22 Dose:  0.4 mg


Zolpidem Tartrate (Ambien)  5 mg PO BEDTIME [facility]


   Last Admin: 06/19/19 21:23 Dose:  Not Given





Discontinued Medications





Enoxaparin Sodium (Lovenox)  40 mg SUBCUT DAILY [facility]


   Last Admin: 06/20/19 08:14 Dose:  40 mg


Fentanyl (Sublimaze)  50 mcg IVPUSH ONETIME ONE


   Stop: 06/19/19 04:50


   Last Admin: 06/19/19 04:57 Dose:  50 mcg


Lactated Ringer's (Ringers, Lactated)  1,000 mls @ 999 mls/hr IV ASDIRECTED [facility]


   Last Admin: 06/19/19 02:13 Dose:  999 mls/hr


Piperacillin Sod/Tazobactam (Sod 4.5 gm/ Sodium Chloride)  100 mls @ 200 mls/hr 

IV Q6H [facility]


   Last Admin: 06/19/19 03:14 Dose:  Not Given


Piperacillin Sod/Tazobactam (Sod 4.5 gm/ Sodium Chloride)  100 mls @ 100 mls/hr 

IV ONETIME ONE


   Stop: 06/19/19 03:06


   Last Admin: 06/19/19 02:40 Dose:  100 mls/hr


Lactated Ringer's (Ringers, Lactated)  1,000 mls @ 999 mls/hr IV ASDIRECTED [facility]


   Last Admin: 06/19/19 03:42 Dose:  999 mls/hr


Sodium Chloride (Normal Saline)  1,000 mls @ 200 mls/hr IV ASDIRECTED [facility]


   Last Admin: 06/19/19 06:42 Dose:  200 mls/hr


Piperacillin/Tazobactam/ (Dextrose 4.5 gm/ Premix)  100 mls @ 200 mls/hr IV Q6H 

[facility]


   Last Admin: 06/20/19 09:33 Dose:  200 mls/hr


Sodium Chloride (Normal Saline)  500 mls @ 999 mls/hr IV ASDIRECTED [facility]


   Stop: 06/19/19 08:31


Sodium Chloride (Normal Saline)  1,000 mls @ 50 mls/hr IV ASDIRECTED [facility]


   Last Admin: 06/20/19 02:32 Dose:  125 mls/hr


Levofloxacin/Dextrose 750 mg/ (Premix)  150 mls @ 100 mls/hr IV Q48H [facility]


   Last Admin: 06/19/19 12:05 Dose:  100 mls/hr


Piperacillin/Tazobactam/ (Dextrose 3.375 gm/ Premix)  50 mls @ 100 mls/hr IV 

Q6H [facility]


Ibuprofen (Motrin)  600 mg PO ONETIME ONE


   Stop: 06/19/19 02:03


   Last Admin: 06/19/19 02:08 Dose:  600 mg


Lorazepam (Ativan)  1 mg IV Q6H PRN


   PRN Reason: Nausea/Vomiting


   Last Admin: 06/20/19 10:23 Dose:  1 mg


Nebivolol (Bystolic)  2.5 mg PO DAILY [facility]


   Last Admin: 06/20/19 08:12 Dose:  2.5 mg


Ondansetron HCl (Zofran)  4 mg IVPUSH ONETIME ONE


   Stop: 06/19/19 02:29


   Last Admin: 06/19/19 02:33 Dose:  4 mg


Sodium Chloride (Saline Flush)  10 ml FLUSH ASDIRECTED PRN


   PRN Reason: Keep Vein Open


   Last Admin: 06/19/19 02:14 Dose:  10 ml


Sodium Chloride (Saline Flush)  10 ml FLUSH ASDIRECTED PRN


   PRN Reason: Keep Vein Open


   Last Admin: 06/19/19 02:35 Dose:  10 ml











- Exam


Quality Assessment: No: Supplemental Oxygen


General: Alert, Oriented, Cooperative, No Acute Distress


Lungs: Normal Respiratory Effort, Crackles (left lung base)


Cardiovascular: Regular Rate, Regular Rhythm


GI/Abdominal Exam: Soft, Non-Tender, No Distention


Extremities: Pedal Edema (trace bilateral ankle edema).  No: Increased Warmth


Skin: Warm, Dry


Psy/Mental Status: Alert, Normal Affect





- Problem List Review


Problem List Initiated/Reviewed/Updated: Yes





- My Orders


Last 24 Hours: 


My Active Orders





06/20/19 10:07


LORazepam [Ativan]   0.5 mg IVPUSH Q4H PRN 





06/20/19 11:37


Sodium Chloride 0.9% [Normal Saline] 1,000 ml IV ASDIRECTED 





06/20/19 12:00


Levofloxacin/Dextrose 5%-Water [Levaquin in D5W 750 MG/150 ML] 750 mg   [facility] 

Bag 1 bag IV Q24H 





06/21/19 05:00


BASIC METABOLIC PANEL,BMP [CHEM] Timed 


CBC W/O DIFF,HEMOGRAM [HEME] Timed (1) 














- Plan


Plan:: 





ASSESSMENT AND PLAN OF CARE





Left lower lobe Pneumonia - off supplemental oxygen. No fevers overnight. I 

suspect the nausea, vomiting and diarrhea are part of the pneumonia syndrome. 

Clostridium difficile testing was negative.


-Gentle IV fluids


-Continue levofloxacin


-discontinue Pip/Tazo


-oxygen to keep sats greater than 95%


-Duo nebs every 6 hours prn, Albuterol nebs every 4 hours prn                  

  


-Follow-up blood cultures





Acute kidney injury - creatinine has normalized with hydration.


-IV fluids as above and repeat labs in the morning





Hx of Pituatary Tumor, with resection x 3, Diabetes Insipidus - electrolytes 

are acceptable at this time.


-continue home medications


-has Desmopressin nasal spray 1 spray bid, patient has his own, must be kept in 

refrigerated


-blood glucose check





Maintenance issues


-Nutrition: regular diet


-Riddle catheter -not indicated at this time


-DVT: Lovenox 40 subcut daily


-GI; PO Protonix 40mg daily





Disposition: home with Family





Primary care provider: Brett Rodriguez MD

## 2019-06-21 RX ADMIN — Medication SCH CAP: at 08:24

## 2019-06-21 RX ADMIN — Medication SCH MCG: at 08:26

## 2019-06-21 RX ADMIN — Medication SCH EACH: at 08:26

## 2019-06-21 NOTE — [id number]
**Discharge Summary





- Hospital Course


Brief History: 59 -year-old male with history of diabetes insipidus secondary 

to resection of a pituitary tumor and essential hypertension who presented with 

weakness, fever, chills and confusion. He was admitted for management of a left 

lower lobe pneumonia with sepsis syndrome and acute kidney injury.


Diagnosis: Stroke: No





- Discharge Data


Discharge Date: 06/21/19


Discharge Disposition: Home, Self-Care 01


Condition: Good





- Discharge Diagnosis/Problem(s)


(1) Community acquired pneumonia


SNOMED Code(s): 629816726


   ICD Code: J18.9 - PNEUMONIA, UNSPECIFIED ORGANISM   Status: Acute   Priority

: High   Current Visit: Yes   


Qualifiers: 


   Laterality: left   Lung location: lower lobe of lung   Qualified Code(s): 

J18.1 - Lobar pneumonia, unspecified organism   





(2) Sepsis


SNOMED Code(s): 46264903


   ICD Code: A41.9 - SEPSIS, UNSPECIFIED ORGANISM   Status: Acute   Current 

Visit: Yes   


Qualifiers: 


   Sepsis type: sepsis due to unspecified organism   Qualified Code(s): A41.9 - 

Sepsis, unspecified organism   





(3) Acute respiratory failure with hypoxia


SNOMED Code(s): 26164023, 943899262


   ICD Code: J96.01 - ACUTE RESPIRATORY FAILURE WITH HYPOXIA   Status: Acute   

Current Visit: Yes   





(4) Acute kidney injury


SNOMED Code(s): 22587929, 59979267


   ICD Code: N17.9 - ACUTE KIDNEY FAILURE, UNSPECIFIED   Status: Acute   

Current Visit: Yes   





(5) Diabetes insipidus


SNOMED Code(s): 42566701


   ICD Code: E23.2 - DIABETES INSIPIDUS   Status: Chronic   Priority: Medium   

Current Visit: Yes   





- Patient Summary/Data


Consults: 


 Consultations





06/19/19 07:13


Consult to Spiritual Care [CONS] Routine 











Hospital Course: 





Edward presented to the emergency room with weakness, confusion as well as fever 

and chills. Workup in the emergency room was suggestive of a left lower lobe 

pneumonia with acute respiratory failure with hypoxia as well as acute kidney 

injury with a creatinine of 1.8. Evidence for sepsis included the confusion, 

hypotension that developed very quickly after presentation to the emergency 

room and the acute kidney injury. He was started on Pip/Tazo in the emergency 

room after cultures were obtained. He received aggressive IV fluid 

resuscitation at the time of presentation. He was admitted to the hospital for 

further management. By the morning after admission he is feeling a little bit 

better. Evidence for sepsis has started to stabilize with improvement in his 

blood pressure. Confusion seemed to have resolved. He did have a headache which 

was thought to be part of the infection syndrome. A head CT was obtained and 

was negative. The morning after admission his white blood cell count and 

platelets both dropped to below normal. There is no evidence for bleeding. 

Levofloxacin was added to the antibiotic regimen. He did have a fair amount of 

diarrhea and we did perform Clostridium difficile testing which was negative. I 

suspect the diarrhea and vomiting or part of the pneumonia syndrome as well. As 

the day after admission progressed he was starting to feel better and we were 

able to wean off the supplemental oxygen. He did have some mild nausea but in 

general was feeling better. By the second morning of admission his vital signs 

have improved. Unfortunately he did have an episode of nausea with vomiting and 

had very poor oral intake during that first part of the day. With additional 

symptomatic management we are able to resolve the nausea. I did stop the Pip/

Tazo at this point and continue monotherapy with his levofloxacin. His kidney 

function had normalized. He has not had any additional fevers throughout the 

course of the hospital stay. Nausea is much better on the day of discharge. He 

has not had any fevers and more than 24 hours. Cultures have all been negative. 

His platelets and white blood cell count remains low but have improved compared 

to yesterday. I believe he is safe for discharge home at this time. He will 

need 6 additional doses of antibiotic therapy with levofloxacin. He will follow-

up if symptoms do not continue to get better or if they get worse.





- Patient Instructions


Diet: Regular Diet as Tolerated


Activity: As Tolerated


Showering/Bathing: May Shower


Notify Provider of: Fever, Increased Pain, Nausea and/or Vomiting


Other/Special Instructions: 1. You were in the hospital for management of 

community-acquired pneumonia involving the left lower lobe of your lung. Your 

condition has been improving with antibiotic therapy. I recommend 6 additional 

doses of antibiotics. Please take levofloxacin 750 mg once daily around 

lunchtime. Urinary dose is due this afternoon. You may use the lorazepam (Ativan

) every 4 hours as needed for nausea. You may use Imodium every 4 hours as 

needed for diarrhea ([facility]  2. 

Continue your other home medications as previously prescribed.  3. Follow up 

with your primary care after you get home if your symptoms do not continue to 

get better or if they get worse.  4. Seek medical attention immediately if you 

develop fever greater than 101, severe shortness of breath or if you have 

persistent vomiting or severe diarrhea.





- Discharge Plan


*PRESCRIPTION DRUG MONITORING PROGRAM REVIEWED*: Not Applicable


*COPY OF PRESCRIPTION DRUG MONITORING REPORT IN PATIENT BETHANY: Not Applicable


Prescriptions/Med Rec: 


Levofloxacin 750 mg PO WITHLUNCH #6 tablet


LORazepam 0.5 mg PO Q4H PRN #10 tab


 PRN Reason: Nausea


Home Medications: 


 Home Meds





Desmopressin [Desmopressin 0.1% Nasal Spray] 500 mcg INH BID 06/19/19 [History]


Docusate Sodium 100 mg PO DAILY 06/19/19 [History]


Finasteride [Proscar] 5 mg PO DAILY 06/19/19 [History]


Hydrocortisone [Cortef] 5 mg PO PCDINNER 06/19/19 [History]


Hydrocortisone [Cortef] 12.5 mg PO PCBREAKFAST 06/19/19 [History]


Levothyroxine 112 mcg PO ACBREAKFAST 06/19/19 [History]


Melatonin/Herbal Complex #184 [Melatonin + l-Theanine Softgel] 3 mg PO BEDTIME 

06/19/19 [History]


Nebivolol HCl [Bystolic] 2.5 mg PO DAILY 06/19/19 [History]


Ramipril [Altace] 10 mg PO DAILY 06/19/19 [History]


Rosuvastatin [Crestor] 20 mg PO BEDTIME 06/19/19 [History]


Tamsulosin HCl [Flomax] 0.4 mg PO BEDTIME 06/19/19 [History]


Testosterone 75 gm TOP DAILY 06/19/19 [History]


Zolpidem Tartrate [Ambien] 5 mg PO BEDTIME 06/19/19 [History]


LORazepam 0.5 mg PO Q4H PRN #10 tab 06/20/19 [Rx]


Levofloxacin 750 mg PO WITHLUNCH #6 tablet 06/20/19 [Rx]








Oxygen Therapy Mode: Room Air


Patient Handouts:  Community-Acquired Pneumonia, Adult, Levofloxacin tablets


Referrals: 


PCP,None [Primary Care Provider] -  (Follow-up with your primary care as needed 

after the hospital stay)





- Discharge Summary/Plan Comment


DC Time >30 min.: No





- Patient Data


Vitals - Most Recent: 


 Last Vital Signs











Temp  37.3 C   06/21/19 07:19


 


Pulse  57 L  06/21/19 07:19


 


Resp  16   06/21/19 07:19


 


BP  147/66 H  06/21/19 08:23


 


Pulse Ox  94 L  06/21/19 07:19











Weight - Most Recent: 95.254 kg


I&O - Last 24 hours: 


 Intake & Output











 06/20/19 06/21/19 06/21/19





 22:59 06:59 14:59


 


Intake Total 2486 868 


 


Balance 2486 868 











Lab Results - Last 24 hrs: 


 Laboratory Results - last 24 hr











  06/21/19 06/21/19 Range/Units





  06:08 06:08 


 


WBC  3.1 L   (4.5-11.0)  K/uL


 


RBC  4.57   (4.30-5.90)  M/uL


 


Hgb  12.4   (12.0-15.0)  g/dL


 


Hct  36.5 L   (40.0-54.0)  %


 


MCV  80   (80-98)  fL


 


MCH  27   (27-31)  pg


 


MCHC  34   (32-36)  %


 


Plt Count  99 L   (150-400)  K/uL


 


Sodium   125 L  (140-148)  mmol/L


 


Potassium   3.9  (3.6-5.2)  mmol/L


 


Chloride   95 L  (100-108)  mmol/L


 


Carbon Dioxide   22  (21-32)  mmol/L


 


Anion Gap   11.9  (5.0-14.0)  mmol/L


 


BUN   9  (7-18)  mg/dL


 


Creatinine   0.8  (0.8-1.3)  mg/dL


 


Est Cr Clr Drug Dosing   90.43  mL/min


 


Estimated GFR (MDRD)   > 60  (>60)  


 


Glucose   79  ()  mg/dL


 


Calcium   7.8 L  (8.5-10.1)  mg/dL











SUJEY Results - Last 24 hrs: 


 Microbiology











 06/19/19 02:00 Aerobic Blood Culture - Preliminary





 Blood - Venous    NO GROWTH AFTER 2 DAYS





 Anaerobic Blood Culture - Preliminary





    NO GROWTH AFTER 2 DAYS


 


 06/19/19 01:45 Aerobic Blood Culture - Preliminary





 Blood - Venous - Lab Draw    NO GROWTH AFTER 2 DAYS





 Anaerobic Blood Culture - Preliminary





    NO GROWTH AFTER 2 DAYS











Med Orders - Current: 


 Current Medications





Acetaminophen (Tylenol)  650 mg PO Q4H PRN


   PRN Reason: Pain (Mild 1-3)/fever


   Last Admin: 06/20/19 07:39 Dose:  650 mg


Albuterol (Proventil Neb Soln)  2.5 mg NEB Q4H PRN


   PRN Reason: Shortness Of Breath/wheezing


Albuterol/Ipratropium (Duoneb 3.0-0.5 Mg/3 Ml)  3 ml NEB QID PRN


   PRN Reason: Shortness Of Breath/wheezing


Finasteride (Proscar)  5 mg PO DAILY [facility]


   Last Admin: 06/21/19 08:26 Dose:  5 mg


Hydrocortisone (Cortef)  5 mg PO PCDINNER [facility]


   Last Admin: 06/20/19 17:17 Dose:  5 mg


Hydrocortisone (Cortef)  12.5 mg PO PCBREAKFAST [facility]


   Last Admin: 06/21/19 08:25 Dose:  12.5 mg


Levofloxacin/Dextrose 750 mg/ (Premix)  150 mls @ 100 mls/hr IV Q24H [facility]


   Last Admin: 06/20/19 13:14 Dose:  100 mls/hr


Sodium Chloride (Normal Saline)  1,000 mls @ 50 mls/hr IV ASDIRECTED [facility]


Ibuprofen (Motrin)  800 mg PO Q6H PRN


   PRN Reason: Pain (mild 1-3)


   Last Admin: 06/20/19 08:09 Dose:  800 mg


Lactobacillus Rhamnosus (Culturelle)  1 cap PO BID [facility]


   Last Admin: 06/21/19 08:24 Dose:  1 cap


Levothyroxine Sodium (Levothyroxine)  112 mcg PO ACBREAKFAST [facility]


   Last Admin: 06/21/19 08:23 Dose:  112 mcg


Loperamide HCl (Imodium)  2 mg PO Q4H PRN


   PRN Reason: Diarrhea


   Last Admin: 06/20/19 20:08 Dose:  2 mg


Lorazepam (Ativan)  0.5 mg IVPUSH Q4H PRN


   PRN Reason: Nausea/Vomiting


Melatonin (Melatonin)  3 mg PO BEDTIME [facility]


   Last Admin: 06/20/19 21:13 Dose:  3 mg


Morphine Sulfate (Morphine)  2 mg IVPUSH Q2H PRN


   PRN Reason: Pain (severe 7-10)


(Desmopressin [ Desmopressin 0.1% Nasal Spray] 500 Mcg )  0 mcg KAYE BID [facility]


   Last Admin: 06/21/19 08:26 Dose:  1 mcg


Ondansetron HCl (Zofran Odt)  4 mg PO Q6H PRN


   PRN Reason: Nausea able to take PO


   Last Admin: 06/20/19 17:17 Dose:  4 mg


Oxycodone HCl (Oxycodone)  5 mg PO Q4H PRN


   PRN Reason: Pain (moderate 4-6)


Pantoprazole Sodium (Protonix***)  40 mg PO DAILY [facility]


   Last Admin: 06/21/19 08:26 Dose:  40 mg


Testosterone 1.62% (Gel 2.5gm Pkt**Pom**)  0 each TOP DAILY [facility]


   Last Admin: 06/21/19 08:26 Dose:  1 each


Ramipril (Altace)  10 mg PO DAILY [facility]


   Last Admin: 06/21/19 08:23 Dose:  10 mg


Rosuvastatin Calcium (Crestor)  20 mg PO BEDTIME [facility]


   Last Admin: 06/20/19 21:12 Dose:  20 mg


Tamsulosin HCl (Flomax)  0.4 mg PO BEDTIME [facility]


   Last Admin: 06/20/19 21:13 Dose:  0.4 mg


Zolpidem Tartrate (Ambien)  5 mg PO BEDTIME [facility]


   Last Admin: 06/20/19 21:11 Dose:  Not Given





Discontinued Medications





Enoxaparin Sodium (Lovenox)  40 mg SUBCUT DAILY [facility]


   Last Admin: 06/20/19 08:14 Dose:  40 mg


Fentanyl (Sublimaze)  50 mcg IVPUSH ONETIME ONE


   Stop: 06/19/19 04:50


   Last Admin: 06/19/19 04:57 Dose:  50 mcg


Lactated Ringer's (Ringers, Lactated)  1,000 mls @ 999 mls/hr IV ASDIRECTED [facility]


   Last Admin: 06/19/19 02:13 Dose:  999 mls/hr


Piperacillin Sod/Tazobactam (Sod 4.5 gm/ Sodium Chloride)  100 mls @ 200 mls/hr 

IV Q6H [facility]


   Last Admin: 06/19/19 03:14 Dose:  Not Given


Piperacillin Sod/Tazobactam (Sod 4.5 gm/ Sodium Chloride)  100 mls @ 100 mls/hr 

IV ONETIME ONE


   Stop: 06/19/19 03:06


   Last Admin: 06/19/19 02:40 Dose:  100 mls/hr


Lactated Ringer's (Ringers, Lactated)  1,000 mls @ 999 mls/hr IV ASDIRECTED [facility]


   Last Admin: 06/19/19 03:42 Dose:  999 mls/hr


Sodium Chloride (Normal Saline)  1,000 mls @ 200 mls/hr IV ASDIRECTED [facility]


   Last Admin: 06/19/19 06:42 Dose:  200 mls/hr


Piperacillin/Tazobactam/ (Dextrose 4.5 gm/ Premix)  100 mls @ 200 mls/hr IV Q6H 

[facility]


   Last Admin: 06/20/19 09:33 Dose:  200 mls/hr


Sodium Chloride (Normal Saline)  500 mls @ 999 mls/hr IV ASDIRECTED [facility]


   Stop: 06/19/19 08:31


Sodium Chloride (Normal Saline)  1,000 mls @ 50 mls/hr IV ASDIRECTED [facility]


   Last Admin: 06/20/19 02:32 Dose:  125 mls/hr


Levofloxacin/Dextrose 750 mg/ (Premix)  150 mls @ 100 mls/hr IV Q48H [facility]


   Last Admin: 06/19/19 12:05 Dose:  100 mls/hr


Piperacillin/Tazobactam/ (Dextrose 3.375 gm/ Premix)  50 mls @ 100 mls/hr IV 

Q6H [facility]


Ibuprofen (Motrin)  600 mg PO ONETIME ONE


   Stop: 06/19/19 02:03


   Last Admin: 06/19/19 02:08 Dose:  600 mg


Lorazepam (Ativan)  1 mg IV Q6H PRN


   PRN Reason: Nausea/Vomiting


   Last Admin: 06/20/19 10:23 Dose:  1 mg


Nebivolol (Bystolic)  2.5 mg PO DAILY [facility]


   Last Admin: 06/20/19 08:12 Dose:  2.5 mg


Ondansetron HCl (Zofran)  4 mg IVPUSH ONETIME ONE


   Stop: 06/19/19 02:29


   Last Admin: 06/19/19 02:33 Dose:  4 mg


Sodium Chloride (Saline Flush)  10 ml FLUSH ASDIRECTED PRN


   PRN Reason: Keep Vein Open


   Last Admin: 06/19/19 02:14 Dose:  10 ml


Sodium Chloride (Saline Flush)  10 ml FLUSH ASDIRECTED PRN


   PRN Reason: Keep Vein Open


   Last Admin: 06/19/19 02:35 Dose:  10 ml











- Exam


Quality Assessment: Denies: Supplemental Oxygen


General: Reports: Alert, Oriented, Cooperative, No Acute Distress


Lungs: Reports: Normal Respiratory Effort


GI/Abdominal Exam: Soft, No Distention


Extremities: No Pedal Edema


Psy/Mental Status: Reports: Alert, Normal Affect